# Patient Record
Sex: MALE | Race: BLACK OR AFRICAN AMERICAN | NOT HISPANIC OR LATINO | Employment: FULL TIME | URBAN - METROPOLITAN AREA
[De-identification: names, ages, dates, MRNs, and addresses within clinical notes are randomized per-mention and may not be internally consistent; named-entity substitution may affect disease eponyms.]

---

## 2023-05-29 ENCOUNTER — OFFICE VISIT (OUTPATIENT)
Dept: PODIATRY | Facility: CLINIC | Age: 43
End: 2023-05-29
Payer: COMMERCIAL

## 2023-05-29 DIAGNOSIS — M20.5X2 ACQUIRED ADDUCTOVARUS ROTATION OF TOE OF LEFT FOOT: ICD-10-CM

## 2023-05-29 DIAGNOSIS — F17.200 SMOKER: ICD-10-CM

## 2023-05-29 DIAGNOSIS — L84 CORN OR CALLUS: ICD-10-CM

## 2023-05-29 DIAGNOSIS — M79.674 PAIN IN TOES OF BOTH FEET: Primary | ICD-10-CM

## 2023-05-29 DIAGNOSIS — M20.5X1 ADDUCTOVARUS ROTATION OF TOE, ACQUIRED, RIGHT: ICD-10-CM

## 2023-05-29 DIAGNOSIS — M79.675 PAIN IN TOES OF BOTH FEET: Primary | ICD-10-CM

## 2023-05-29 PROCEDURE — 99999 PR PBB SHADOW E&M-NEW PATIENT-LVL II: ICD-10-PCS | Mod: PBBFAC,,, | Performed by: PODIATRIST

## 2023-05-29 PROCEDURE — 99203 OFFICE O/P NEW LOW 30 MIN: CPT | Mod: S$GLB,,, | Performed by: PODIATRIST

## 2023-05-29 PROCEDURE — 1160F RVW MEDS BY RX/DR IN RCRD: CPT | Mod: CPTII,S$GLB,, | Performed by: PODIATRIST

## 2023-05-29 PROCEDURE — 1159F PR MEDICATION LIST DOCUMENTED IN MEDICAL RECORD: ICD-10-PCS | Mod: CPTII,S$GLB,, | Performed by: PODIATRIST

## 2023-05-29 PROCEDURE — 1159F MED LIST DOCD IN RCRD: CPT | Mod: CPTII,S$GLB,, | Performed by: PODIATRIST

## 2023-05-29 PROCEDURE — 99999 PR PBB SHADOW E&M-NEW PATIENT-LVL II: CPT | Mod: PBBFAC,,, | Performed by: PODIATRIST

## 2023-05-29 PROCEDURE — 1160F PR REVIEW ALL MEDS BY PRESCRIBER/CLIN PHARMACIST DOCUMENTED: ICD-10-PCS | Mod: CPTII,S$GLB,, | Performed by: PODIATRIST

## 2023-05-29 PROCEDURE — 99203 PR OFFICE/OUTPT VISIT, NEW, LEVL III, 30-44 MIN: ICD-10-PCS | Mod: S$GLB,,, | Performed by: PODIATRIST

## 2023-05-29 NOTE — LETTER
May 29, 2023      [facility]  [id number] [place] [id number]  Phone: [phone]  Fax: [phone]       Patient: Branden Yañez   YOB: 1980  Date of Visit: 05/29/2023    To Whom It May Concern:    Amy Yañez  was [facility] on 05/29/2023. Please excuse patient from missed work on 5/29/2023 and 5/30/2023. If you have any questions or concerns, or if I can be of further assistance, please do not hesitate to contact me.    Sincerely,    Almita Sanon DPM

## 2023-05-29 NOTE — PROGRESS NOTES
Subjective:       Patient ID: Branden Yañez is a 42 y.o. male.    Chief Complaint: Nail Problem (Patient complains of 5/10 pain at present to bilateral 5th toenails. )      HPI: Branden Yañez presents to the office today, with complaints of pains to the left and right foot at the 5th toe.  States 5/10 pain.  States difficulties walking.  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 The pains are stated as moderate to severe at the 5th digits. Patient states limping with gait at times due to the pains. Pains are exacerbated by walking and standing. Sitting and limited WB does alleviate and/or decrease the pains.  Patient does have rotational deformity of the right and left 5th digits. States alternation of shoe gear has not been helpful. Patient's Primary Care Provider is Primary Doctor No.     Review of patient's allergies indicates:  Not on File    No past medical history on file.    No family history on file.    Social History     Socioeconomic History    Marital status: Single       No past surgical history on file.    Review of Systems      Objective:   There were no vitals taken for this visit.    No image results found.        Physical Exam    LOWER EXTREMITY PHYSICAL EXAMINATION    DERMATOLOGY: Skin is supple, dry and intact. No ecchymosis is noted. No hypertrophic skin formation. No erythema or cellulitis is noted.     ORTHOPEDIC:  Adductovarus rotational changes present to the right and left 5th digit.  Pain on the lateral nail fold of the digital nail/callus.  No pain with range of motion of the interphalangeal joint of the metatarsal     Assessment:     1. Pain in toes of both feet    2. Corn or callus    3. Acquired adductovarus rotation of toe of left foot    4. Adductovarus rotation of toe, acquired, right    5. Smoker          Plan:     Pain in toes of both feet    Corn or callus    Acquired adductovarus rotation of toe of left foot    Adductovarus rotation of toe, acquired,  right    Smoker      Thorough discussion is had with the patient today, concerning the diagnosis, its etiology, and the treatment algorithm at present.    This patient does have adductovarus rotational contracture of the digits. I did advise the patient to ambulate with shoe gear that is high in the tox box to allow for extra room and depth in the sagittal plane, in order to alleviate and lessen the potential for dorsal digital break down at the IPJs. I do also recommended shoe gear that is soft and supple in the foot bed as to lessen the potential for plantar distal digital break down at the contracted digits. If the patient does not feel the aforementioned is necessary, he or she may also purchase OTC padding devices to be worn across the MTPJ, at the distal aspects of the digits, and/or at the dorsal aspects of the IPJs. The patient does acknowledge understanding and is said to be amenable to compliance.     Hypertrophic skin formation, as outlined within the examination portion of this note, is surgically debrided with sharp #10/#15 blade, to alleviate discomfort with weight bearing and ambulation, and to lessen the possibility of skin complications, e.g., ulceration due to pressure. No ulceration(s) is are noted with/post debridement. The lesion is completed healed and resolved. No evidence of infection.    Discussed with patient that if removal of the callus does not alleviate his pain discomfort, patient may be candidate for derotational arthroplasty.  In his current condition, patient would need to wait a proximally 2 months until he increased risk of wounds and nonhealing.    Did discuss in detail the harmful effects of nicotine/tobacco/cigarette smoking, especially in relation to the lower extremity. I do rec. consultation with primary care provider for further discussed of smoking cessation methods. Smoking & Tobacco use cessation couseling was rendered at today's visit; intermediate, bewteen 3 and 10  minutes.        No future appointments.